# Patient Record
Sex: MALE | Race: WHITE | NOT HISPANIC OR LATINO | Employment: OTHER | URBAN - METROPOLITAN AREA
[De-identification: names, ages, dates, MRNs, and addresses within clinical notes are randomized per-mention and may not be internally consistent; named-entity substitution may affect disease eponyms.]

---

## 2017-07-22 ENCOUNTER — PREPPED CHART (OUTPATIENT)
Dept: URBAN - METROPOLITAN AREA CLINIC 46 | Facility: CLINIC | Age: 82
End: 2017-07-22

## 2018-07-09 ASSESSMENT — VISUAL ACUITY
OD_CC: 20/25
OD_CC: J2
OS_SC: 20/70
OS_CC: J2
OS_CC: 20/30-2
OS_SC: J10
OD_SC: J10
OD_SC: 20/30

## 2018-07-09 ASSESSMENT — TONOMETRY
OD_IOP_MMHG: 12
OS_IOP_MMHG: 12

## 2018-07-10 ENCOUNTER — ESTABLISHED COMPREHENSIVE EXAM (OUTPATIENT)
Dept: URBAN - METROPOLITAN AREA CLINIC 46 | Facility: CLINIC | Age: 83
End: 2018-07-10

## 2018-07-10 DIAGNOSIS — H52.7: ICD-10-CM

## 2018-07-10 PROCEDURE — 92014 COMPRE OPH EXAM EST PT 1/>: CPT

## 2018-07-10 PROCEDURE — 92015 DETERMINE REFRACTIVE STATE: CPT

## 2018-07-10 ASSESSMENT — VISUAL ACUITY
OD_CC: 20/30
OD_SC: J10
OS_SC: 20/70
OS_SC: J10
OS_CC: J2
OD_SC: 20/50-1
OD_CC: J2
OS_CC: 20/40-2

## 2018-07-10 ASSESSMENT — TONOMETRY
OD_IOP_MMHG: 11
OS_IOP_MMHG: 11

## 2018-08-14 ENCOUNTER — FOLLOW UP (OUTPATIENT)
Dept: URBAN - METROPOLITAN AREA CLINIC 46 | Facility: CLINIC | Age: 83
End: 2018-08-14

## 2018-08-14 DIAGNOSIS — H35.3130: ICD-10-CM

## 2018-08-14 PROCEDURE — G9903 PT SCRN TBCO ID AS NON USER: HCPCS

## 2018-08-14 PROCEDURE — 4177F TALK PT/CRGVR RE AREDS PREV: CPT

## 2018-08-14 PROCEDURE — G8427 DOCREV CUR MEDS BY ELIG CLIN: HCPCS

## 2018-08-14 PROCEDURE — 2019F DILATED MACUL EXAM DONE: CPT

## 2018-08-14 PROCEDURE — 92012 INTRM OPH EXAM EST PATIENT: CPT

## 2018-08-14 PROCEDURE — 92134 CPTRZ OPH DX IMG PST SGM RTA: CPT

## 2018-08-14 PROCEDURE — G9974 MAC EXAM PERF: HCPCS

## 2018-08-14 PROCEDURE — 1036F TOBACCO NON-USER: CPT

## 2018-08-14 ASSESSMENT — TONOMETRY
OD_IOP_MMHG: 10
OS_IOP_MMHG: 10

## 2018-08-14 ASSESSMENT — VISUAL ACUITY
OS_SC: J3
OD_CC: 20/25+2
OS_CC: 20/40
OU_SC: J1
OD_SC: J2

## 2018-10-23 NOTE — PATIENT DISCUSSION
The patient feels that the cataract is significantly impacting daily activities and has elected cataract surgery. The risks, benefits, and alternatives to surgery were discussed.  The patient elects to proceed with surgery.  has worn monoV with CLs in the past.

## 2018-11-15 ENCOUNTER — FOLLOW UP (OUTPATIENT)
Dept: URBAN - METROPOLITAN AREA CLINIC 46 | Facility: CLINIC | Age: 83
End: 2018-11-15

## 2018-11-15 DIAGNOSIS — H52.7: ICD-10-CM

## 2018-11-15 PROCEDURE — 99211T TECH SERVICE

## 2018-11-15 ASSESSMENT — VISUAL ACUITY
OD_CC: 20/25
OD_CC: J2
OS_CC: 20/40
OS_CC: J5

## 2018-11-30 NOTE — PATIENT DISCUSSION
discussed everything looks fantastic I am confident this feeling will pass.  Has no CME, no K edema, no C/F.  ed could Rx Ilevro and try for a month but pt concerned with cost.

## 2019-06-26 ENCOUNTER — ESTABLISHED COMPREHENSIVE EXAM (OUTPATIENT)
Dept: URBAN - METROPOLITAN AREA CLINIC 46 | Facility: CLINIC | Age: 84
End: 2019-06-26

## 2019-06-26 DIAGNOSIS — H52.7: ICD-10-CM

## 2019-06-26 PROCEDURE — 92015 DETERMINE REFRACTIVE STATE: CPT

## 2019-06-26 PROCEDURE — 92014 COMPRE OPH EXAM EST PT 1/>: CPT

## 2019-06-26 ASSESSMENT — VISUAL ACUITY
OS_CC: J<10
OD_CC: J2
OS_SC: J<10
OD_CC: 20/30-1
OD_SC: 20/40
OS_CC: 20/40
OS_SC: 20/50
OD_SC: J10

## 2019-06-26 ASSESSMENT — TONOMETRY
OS_IOP_MMHG: 11
OD_IOP_MMHG: 10

## 2019-12-30 ENCOUNTER — FOLLOW UP (OUTPATIENT)
Dept: URBAN - METROPOLITAN AREA CLINIC 46 | Facility: CLINIC | Age: 84
End: 2019-12-30

## 2019-12-30 DIAGNOSIS — H35.3130: ICD-10-CM

## 2019-12-30 PROCEDURE — 92134 CPTRZ OPH DX IMG PST SGM RTA: CPT

## 2019-12-30 PROCEDURE — 92012 INTRM OPH EXAM EST PATIENT: CPT

## 2019-12-30 ASSESSMENT — VISUAL ACUITY
OS_CC: 20/50
OD_CC: J2
OD_CC: 20/20-1

## 2019-12-30 ASSESSMENT — TONOMETRY
OS_IOP_MMHG: 11
OD_IOP_MMHG: 11

## 2020-06-08 ENCOUNTER — PREPPED CHART (OUTPATIENT)
Dept: URBAN - METROPOLITAN AREA CLINIC 46 | Facility: CLINIC | Age: 85
End: 2020-06-08

## 2021-09-10 NOTE — PATIENT DISCUSSION
9/10/2021:  Recommended aggressive dry eye treatment today and tomorrow with PFATs every 2 hours and use gel at night for 2 weeks.  ed this was just exposure.  No ABMD so this does NOT appear to be an RCE.

## 2021-10-22 NOTE — PATIENT DISCUSSION
10/22/2021:  Despite some risk factors, the patient does not demonstrate definitive evidence of glaucoma at this time.  RNFL change (very subtle) noted on Optos today.  watch this area in the future with serial analysis.